# Patient Record
Sex: FEMALE | Race: WHITE | Employment: FULL TIME | URBAN - METROPOLITAN AREA
[De-identification: names, ages, dates, MRNs, and addresses within clinical notes are randomized per-mention and may not be internally consistent; named-entity substitution may affect disease eponyms.]

---

## 2017-07-18 ENCOUNTER — TELEPHONE (OUTPATIENT)
Dept: OBGYN CLINIC | Facility: CLINIC | Age: 37
End: 2017-07-18

## 2017-07-18 RX ORDER — BREAST PUMP
EACH MISCELLANEOUS
Qty: 1 EACH | Refills: 0 | Status: SHIPPED | OUTPATIENT
Start: 2017-07-18

## 2017-07-18 NOTE — TELEPHONE ENCOUNTER
Breast pump RX and referral faxed to [phone] Informed pt that breast pump order was faxed to [phone] Pt verbalized understanding and agrees with plan.

## 2017-07-18 NOTE — TELEPHONE ENCOUNTER
Needs 2nd breast pump order electric before pts daughter turns 1 , [facility]mo covers. #[phone] leave .